# Patient Record
Sex: FEMALE | NOT HISPANIC OR LATINO | URBAN - METROPOLITAN AREA
[De-identification: names, ages, dates, MRNs, and addresses within clinical notes are randomized per-mention and may not be internally consistent; named-entity substitution may affect disease eponyms.]

---

## 2018-01-31 ENCOUNTER — OFFICE (OUTPATIENT)
Dept: URBAN - METROPOLITAN AREA CLINIC 64 | Facility: CLINIC | Age: 43
End: 2018-01-31

## 2018-01-31 VITALS
SYSTOLIC BLOOD PRESSURE: 117 MMHG | HEIGHT: 60 IN | DIASTOLIC BLOOD PRESSURE: 68 MMHG | WEIGHT: 127 LBS | HEART RATE: 64 BPM

## 2018-01-31 DIAGNOSIS — B18.2 CHRONIC VIRAL HEPATITIS C: ICD-10-CM

## 2018-01-31 DIAGNOSIS — R10.11 RIGHT UPPER QUADRANT PAIN: ICD-10-CM

## 2018-01-31 PROCEDURE — 99213 OFFICE O/P EST LOW 20 MIN: CPT | Performed by: INTERNAL MEDICINE

## 2018-12-11 ENCOUNTER — OFFICE (OUTPATIENT)
Dept: URBAN - METROPOLITAN AREA CLINIC 64 | Facility: CLINIC | Age: 43
End: 2018-12-11
Payer: COMMERCIAL

## 2018-12-11 VITALS
HEART RATE: 73 BPM | HEIGHT: 60 IN | WEIGHT: 132 LBS | SYSTOLIC BLOOD PRESSURE: 123 MMHG | DIASTOLIC BLOOD PRESSURE: 80 MMHG

## 2018-12-11 DIAGNOSIS — B18.2 CHRONIC VIRAL HEPATITIS C: ICD-10-CM

## 2018-12-11 PROCEDURE — 99213 OFFICE O/P EST LOW 20 MIN: CPT | Performed by: NURSE PRACTITIONER

## 2020-04-24 ENCOUNTER — OFFICE (OUTPATIENT)
Dept: URBAN - METROPOLITAN AREA CLINIC 64 | Facility: CLINIC | Age: 45
End: 2020-04-24

## 2020-08-07 ENCOUNTER — OFFICE (OUTPATIENT)
Dept: URBAN - METROPOLITAN AREA CLINIC 64 | Facility: CLINIC | Age: 45
End: 2020-08-07

## 2020-08-07 VITALS
DIASTOLIC BLOOD PRESSURE: 93 MMHG | HEIGHT: 60 IN | SYSTOLIC BLOOD PRESSURE: 145 MMHG | WEIGHT: 130 LBS | HEART RATE: 87 BPM

## 2020-08-07 DIAGNOSIS — B18.2 CHRONIC VIRAL HEPATITIS C: ICD-10-CM

## 2020-08-07 DIAGNOSIS — B35.1 TINEA UNGUIUM: ICD-10-CM

## 2020-08-07 PROCEDURE — 99213 OFFICE O/P EST LOW 20 MIN: CPT | Performed by: INTERNAL MEDICINE

## 2020-08-07 RX ORDER — TERBINAFINE HYDROCHLORIDE 250 MG/1
TABLET ORAL
Qty: 90 | Refills: 1 | Status: COMPLETED
Start: 2020-08-07 | End: 2022-08-12

## 2022-08-12 ENCOUNTER — OFFICE (OUTPATIENT)
Dept: URBAN - METROPOLITAN AREA CLINIC 64 | Facility: CLINIC | Age: 47
End: 2022-08-12

## 2022-08-12 VITALS
HEIGHT: 60 IN | SYSTOLIC BLOOD PRESSURE: 141 MMHG | WEIGHT: 136 LBS | DIASTOLIC BLOOD PRESSURE: 82 MMHG | HEART RATE: 68 BPM

## 2022-08-12 DIAGNOSIS — Z83.71 FAMILY HISTORY OF COLONIC POLYPS: ICD-10-CM

## 2022-08-12 DIAGNOSIS — Z80.0 FAMILY HISTORY OF MALIGNANT NEOPLASM OF DIGESTIVE ORGANS: ICD-10-CM

## 2022-08-12 PROCEDURE — 99214 OFFICE O/P EST MOD 30 MIN: CPT | Performed by: INTERNAL MEDICINE

## 2022-10-21 ENCOUNTER — ON CAMPUS - OUTPATIENT (OUTPATIENT)
Dept: URBAN - METROPOLITAN AREA HOSPITAL 2 | Facility: HOSPITAL | Age: 47
End: 2022-10-21
Payer: COMMERCIAL

## 2022-10-21 ENCOUNTER — OFFICE (OUTPATIENT)
Dept: URBAN - METROPOLITAN AREA PATHOLOGY 4 | Facility: PATHOLOGY | Age: 47
End: 2022-10-21
Payer: COMMERCIAL

## 2022-10-21 VITALS
SYSTOLIC BLOOD PRESSURE: 124 MMHG | DIASTOLIC BLOOD PRESSURE: 89 MMHG | SYSTOLIC BLOOD PRESSURE: 114 MMHG | DIASTOLIC BLOOD PRESSURE: 78 MMHG | SYSTOLIC BLOOD PRESSURE: 146 MMHG | HEART RATE: 72 BPM | DIASTOLIC BLOOD PRESSURE: 82 MMHG | HEART RATE: 74 BPM | HEART RATE: 70 BPM | DIASTOLIC BLOOD PRESSURE: 56 MMHG | WEIGHT: 138 LBS | RESPIRATION RATE: 18 BRPM | DIASTOLIC BLOOD PRESSURE: 79 MMHG | DIASTOLIC BLOOD PRESSURE: 87 MMHG | HEART RATE: 64 BPM | SYSTOLIC BLOOD PRESSURE: 112 MMHG | TEMPERATURE: 97.8 F | OXYGEN SATURATION: 98 % | DIASTOLIC BLOOD PRESSURE: 75 MMHG | SYSTOLIC BLOOD PRESSURE: 113 MMHG | HEART RATE: 67 BPM | RESPIRATION RATE: 15 BRPM | SYSTOLIC BLOOD PRESSURE: 122 MMHG | SYSTOLIC BLOOD PRESSURE: 121 MMHG | OXYGEN SATURATION: 99 % | OXYGEN SATURATION: 100 % | HEART RATE: 71 BPM | HEIGHT: 60 IN | RESPIRATION RATE: 12 BRPM | RESPIRATION RATE: 16 BRPM | HEART RATE: 65 BPM | HEART RATE: 68 BPM

## 2022-10-21 DIAGNOSIS — Z12.11 ENCOUNTER FOR SCREENING FOR MALIGNANT NEOPLASM OF COLON: ICD-10-CM

## 2022-10-21 DIAGNOSIS — K63.5 POLYP OF COLON: ICD-10-CM

## 2022-10-21 DIAGNOSIS — Z83.71 FAMILY HISTORY OF COLONIC POLYPS: ICD-10-CM

## 2022-10-21 DIAGNOSIS — Z80.0 FAMILY HISTORY OF MALIGNANT NEOPLASM OF DIGESTIVE ORGANS: ICD-10-CM

## 2022-10-21 DIAGNOSIS — D12.4 BENIGN NEOPLASM OF DESCENDING COLON: ICD-10-CM

## 2022-10-21 LAB
GI HISTOLOGY: A. UNSPECIFIED: (no result)
GI HISTOLOGY: PDF REPORT: (no result)

## 2022-10-21 PROCEDURE — 45385 COLONOSCOPY W/LESION REMOVAL: CPT | Mod: 33 | Performed by: INTERNAL MEDICINE

## 2022-10-21 PROCEDURE — 88305 TISSUE EXAM BY PATHOLOGIST: CPT | Performed by: INTERNAL MEDICINE

## 2022-12-02 ENCOUNTER — OFFICE VISIT (OUTPATIENT)
Dept: CARDIOLOGY | Facility: CLINIC | Age: 47
End: 2022-12-02

## 2022-12-02 VITALS
OXYGEN SATURATION: 100 % | SYSTOLIC BLOOD PRESSURE: 143 MMHG | WEIGHT: 141 LBS | HEART RATE: 81 BPM | BODY MASS INDEX: 27.68 KG/M2 | HEIGHT: 60 IN | DIASTOLIC BLOOD PRESSURE: 95 MMHG

## 2022-12-02 DIAGNOSIS — R07.9 CHEST PAIN WITH MODERATE RISK FOR CARDIAC ETIOLOGY: Primary | ICD-10-CM

## 2022-12-02 DIAGNOSIS — Z00.00 PREVENTATIVE HEALTH CARE: ICD-10-CM

## 2022-12-02 DIAGNOSIS — R03.0 ELEVATED BLOOD PRESSURE READING IN OFFICE WITHOUT DIAGNOSIS OF HYPERTENSION: ICD-10-CM

## 2022-12-02 PROCEDURE — 99204 OFFICE O/P NEW MOD 45 MIN: CPT | Performed by: INTERNAL MEDICINE

## 2022-12-02 RX ORDER — NITROGLYCERIN 0.4 MG/1
0.4 TABLET SUBLINGUAL
COMMUNITY
Start: 2022-11-16

## 2022-12-02 RX ORDER — GLUCOSAMINE/D3/BOSWELLIA SERRA 1500MG-400
TABLET ORAL
COMMUNITY

## 2022-12-02 NOTE — PROGRESS NOTES
Encounter Date:12/02/2022      Patient ID: Lidia Khalil is a 47 y.o. female.    Chief Complaint   Patient presents with   • Abnormal ECG          History of Present Illness  Lidia is a 47-year-old with a history of hepatitis C status posttreatment and comes in as a new patient consult for chest pain.  She said about 2 weeks ago she was sitting and having her coffee when she felt a tightness in her chest which was going straight to the back.  It occurred at rest.  It did not radiate down her arm or up her neck.  Her  also mentions that she was pale during this episode.  She also felt like she could not catch her breath.  She did not go to the hospital for this episode and she said it resolved on its own after about 10 minutes.  She then saw her PCP and EKG in the office showed normal sinus rhythm with poor R wave progression in the anterolateral leads.    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  However she does go hunting with her family.  She has noticed that whenever she has to go up a hill or exert herself she does get short of breath and fatigued easily.  She has not no further episodes of chest pain.  She said she had a lipid panel with her PCP which she reports is normal.  No evidence of diabetes on her lab work earlier this year.    EKG reviewed in clinic today showed normal sinus rhythm with poor R wave progression.    The following portions of the patient's history were reviewed and updated as appropriate: allergies, current medications, past family history, past medical history, past social history, past surgical history, and problem list.    Review of Systems   Constitutional: Negative for malaise/fatigue.   Cardiovascular: Positive for chest pain and palpitations. Negative for dyspnea on exertion and leg swelling.   Respiratory: Negative for cough and shortness of breath.    Gastrointestinal: Negative for abdominal pain, nausea and vomiting.   Neurological: Negative for  dizziness, focal weakness, headaches, light-headedness and numbness.   All other systems reviewed and are negative.        Current Outpatient Medications:   •  Biotin 88005 MCG tablet, Take  by mouth., Disp: , Rfl:   •  nitroglycerin (NITROSTAT) 0.4 MG SL tablet, Place 0.4 mg under the tongue Every 5 (Five) Minutes As Needed. do not exceed a total of 3 doses in 15 minutes, Disp: , Rfl:     No Known Allergies    Family History   Problem Relation Age of Onset   • Heart failure Mother    • Hypertension Mother    • Heart disease Mother    • Hyperlipidemia Father    • Asthma Father    • Heart disease Father    • Hypertension Father    • No Known Problems Sister    • No Known Problems Brother    • No Known Problems Maternal Aunt    • No Known Problems Maternal Uncle    • No Known Problems Paternal Aunt    • No Known Problems Paternal Uncle    • No Known Problems Maternal Grandmother    • No Known Problems Maternal Grandfather    • No Known Problems Paternal Grandmother    • No Known Problems Paternal Grandfather    • No Known Problems Other    • Anemia Neg Hx    • Arrhythmia Neg Hx    • Clotting disorder Neg Hx    • Fainting Neg Hx    • Heart attack Neg Hx    • Diabetes Neg Hx    • Arthritis Neg Hx    • Cancer Neg Hx        Past Surgical History:   Procedure Laterality Date   • DIAGNOSTIC LAPAROSCOPY  1989   • DILATATION AND CURETTAGE  2011   • TONSILLECTOMY  1990   • TUBAL ABDOMINAL LIGATION  1999       Past Medical History:   Diagnosis Date   • Abnormal ECG 11-   • Colon polyps    • Hepatitis        Social History     Socioeconomic History   • Marital status:    Tobacco Use   • Smoking status: Former     Packs/day: 1.50     Years: 18.00     Pack years: 27.00     Types: Cigarettes     Start date: 5/1/1989     Quit date: 8/13/2007     Years since quitting: 15.3     Passive exposure: Past   • Smokeless tobacco: Never   Vaping Use   • Vaping Use: Never used   Substance and Sexual Activity   • Alcohol use: Yes  "    Alcohol/week: 7.0 standard drinks     Types: 7 Shots of liquor per week   • Drug use: Not Currently     Types: Amphetamines     Comment: [id number]   • Sexual activity: Yes     Partners: Male     Birth control/protection: Surgical         Procedures      Objective:       Physical Exam    /95 (BP Location: Left arm)   Pulse 81   Ht 152.4 cm (60\")   Wt 64 kg (141 lb)   SpO2 100%   BMI 27.54 kg/m²   The patient is alert, oriented and in no distress.    Vital signs as noted above.    Head and neck revealed no carotid bruits or jugular venous distension.  No thyromegaly or lymphadenopathy is present.    Lungs clear.  No wheezing.  Breath sounds are normal bilaterally.    Heart normal first and second heart sounds.  No murmur..  No pericardial rub is present.  No gallop is present.    Abdomen soft and nontender.  No organomegaly is present.    Extremities revealed good peripheral pulses without any pedal edema.    Skin warm and dry.    Musculoskeletal system is grossly normal.    CNS grossly normal.           Diagnosis Plan   1. Chest pain with moderate risk for cardiac etiology  Stress Test With Myocardial Perfusion One Day    Adult Transthoracic Echo Complete W/ Color, Spectral and Contrast if Necessary Per Protocol      2. Elevated blood pressure reading in office without diagnosis of hypertension        3. Preventative health care        LAB RESULTS (LAST 7 DAYS)    CBC        BMP        CMP         BNP        TROPONIN        CoAg        Creatinine Clearance  CrCl cannot be calculated (No successful lab value found.).    ABG        Radiology  No radiology results for the last day         Assessment and Plan       Diagnoses and all orders for this visit:    1. Chest pain with moderate risk for cardiac etiology (Primary)  -     Stress Test With Myocardial Perfusion One Day; Future  -     Adult Transthoracic Echo Complete W/ Color, Spectral and Contrast if Necessary Per Protocol; Future    2. Elevated blood " pressure reading in office without diagnosis of hypertension    3. Preventative health care         Chest pain  Her symptoms are concerning for angina  She is also had a decrease in her activity and gets winded easily now.  I would like to proceed with a stress test  Risk factors include family history and age.  She has no history of gestational diabetes or hypertension  Go to the ER if she has any further episodes of chest pain    Preventative care  Lipid Panel and diabetes screening negative with PCP    Elevated blood pressure  Blood pressure in clinic is 139/88  At home she usually runs in the 120s over 80s  Continue to monitor with home blood pressure cuff    Eva Tolentino MD

## 2022-12-07 ENCOUNTER — HOSPITAL ENCOUNTER (OUTPATIENT)
Dept: CARDIOLOGY | Facility: HOSPITAL | Age: 47
Discharge: HOME | End: 2022-12-07

## 2022-12-07 VITALS — HEIGHT: 60 IN | BODY MASS INDEX: 27.68 KG/M2 | WEIGHT: 141 LBS

## 2022-12-07 DIAGNOSIS — R07.9 CHEST PAIN WITH MODERATE RISK FOR CARDIAC ETIOLOGY: ICD-10-CM

## 2022-12-07 PROCEDURE — 93017 CV STRESS TEST TRACING ONLY: CPT

## 2022-12-07 PROCEDURE — A9500 TC99M SESTAMIBI: HCPCS | Performed by: INTERNAL MEDICINE

## 2022-12-07 PROCEDURE — 0 TECHNETIUM SESTAMIBI: Performed by: INTERNAL MEDICINE

## 2022-12-07 PROCEDURE — 93016 CV STRESS TEST SUPVJ ONLY: CPT | Performed by: INTERNAL MEDICINE

## 2022-12-07 PROCEDURE — 93306 TTE W/DOPPLER COMPLETE: CPT

## 2022-12-07 PROCEDURE — 78452 HT MUSCLE IMAGE SPECT MULT: CPT

## 2022-12-07 PROCEDURE — 93018 CV STRESS TEST I&R ONLY: CPT | Performed by: INTERNAL MEDICINE

## 2022-12-07 PROCEDURE — 93306 TTE W/DOPPLER COMPLETE: CPT | Performed by: INTERNAL MEDICINE

## 2022-12-07 PROCEDURE — 78452 HT MUSCLE IMAGE SPECT MULT: CPT | Performed by: INTERNAL MEDICINE

## 2022-12-07 RX ADMIN — TECHNETIUM TC 99M SESTAMIBI 1 DOSE: 1 INJECTION INTRAVENOUS at 10:09

## 2022-12-07 RX ADMIN — TECHNETIUM TC 99M SESTAMIBI 1 DOSE: 1 INJECTION INTRAVENOUS at 09:07

## 2022-12-09 ENCOUNTER — TELEPHONE (OUTPATIENT)
Dept: CARDIOLOGY | Facility: CLINIC | Age: 47
End: 2022-12-09

## 2022-12-12 LAB
BH CV ECHO MEAS - ACS: 1.67 CM
BH CV ECHO MEAS - AO MAX PG: 6.1 MMHG
BH CV ECHO MEAS - AO MEAN PG: 3.6 MMHG
BH CV ECHO MEAS - AO ROOT DIAM: 2.7 CM
BH CV ECHO MEAS - AO V2 MAX: 123.1 CM/SEC
BH CV ECHO MEAS - AO V2 VTI: 29.7 CM
BH CV ECHO MEAS - AVA(I,D): 1.34 CM2
BH CV ECHO MEAS - EDV(CUBED): 73.5 ML
BH CV ECHO MEAS - EDV(MOD-SP2): 92.5 ML
BH CV ECHO MEAS - EDV(MOD-SP4): 62.1 ML
BH CV ECHO MEAS - EF(MOD-SP2): 62.5 %
BH CV ECHO MEAS - EF(MOD-SP4): 56.2 %
BH CV ECHO MEAS - ESV(CUBED): 39.1 ML
BH CV ECHO MEAS - ESV(MOD-SP2): 34.7 ML
BH CV ECHO MEAS - ESV(MOD-SP4): 27.2 ML
BH CV ECHO MEAS - FS: 19 %
BH CV ECHO MEAS - IVS/LVPW: 1.01 CM
BH CV ECHO MEAS - IVSD: 0.91 CM
BH CV ECHO MEAS - LA DIMENSION: 2.7 CM
BH CV ECHO MEAS - LV DIASTOLIC VOL/BSA (35-75): 38.6 CM2
BH CV ECHO MEAS - LV MASS(C)D: 118.8 GRAMS
BH CV ECHO MEAS - LV MAX PG: 3.6 MMHG
BH CV ECHO MEAS - LV MEAN PG: 2.07 MMHG
BH CV ECHO MEAS - LV SYSTOLIC VOL/BSA (12-30): 16.9 CM2
BH CV ECHO MEAS - LV V1 MAX: 95.3 CM/SEC
BH CV ECHO MEAS - LV V1 VTI: 19.7 CM
BH CV ECHO MEAS - LVIDD: 4.2 CM
BH CV ECHO MEAS - LVIDS: 3.4 CM
BH CV ECHO MEAS - LVOT AREA: 2.02 CM2
BH CV ECHO MEAS - LVOT DIAM: 1.6 CM
BH CV ECHO MEAS - LVPWD: 0.9 CM
BH CV ECHO MEAS - MV A MAX VEL: 86.4 CM/SEC
BH CV ECHO MEAS - MV DEC SLOPE: 464.2 CM/SEC2
BH CV ECHO MEAS - MV DEC TIME: 0.18 MSEC
BH CV ECHO MEAS - MV E MAX VEL: 83.1 CM/SEC
BH CV ECHO MEAS - MV E/A: 0.96
BH CV ECHO MEAS - MV MAX PG: 2.9 MMHG
BH CV ECHO MEAS - MV MEAN PG: 1.38 MMHG
BH CV ECHO MEAS - MV V2 VTI: 30 CM
BH CV ECHO MEAS - MVA(VTI): 1.32 CM2
BH CV ECHO MEAS - PA ACC TIME: 0.15 SEC
BH CV ECHO MEAS - PA PR(ACCEL): 13.6 MMHG
BH CV ECHO MEAS - PULM DIAS VEL: 58.8 CM/SEC
BH CV ECHO MEAS - PULM S/D: 1.34
BH CV ECHO MEAS - PULM SYS VEL: 78.9 CM/SEC
BH CV ECHO MEAS - RAP SYSTOLE: 3 MMHG
BH CV ECHO MEAS - RV MAX PG: 2.27 MMHG
BH CV ECHO MEAS - RV V1 MAX: 75.4 CM/SEC
BH CV ECHO MEAS - RV V1 VTI: 14.5 CM
BH CV ECHO MEAS - RVDD: 2.9 CM
BH CV ECHO MEAS - RVSP: 17.6 MMHG
BH CV ECHO MEAS - SI(MOD-SP2): 36 ML/M2
BH CV ECHO MEAS - SI(MOD-SP4): 21.7 ML/M2
BH CV ECHO MEAS - SV(LVOT): 39.7 ML
BH CV ECHO MEAS - SV(MOD-SP2): 57.8 ML
BH CV ECHO MEAS - SV(MOD-SP4): 34.9 ML
BH CV ECHO MEAS - TR MAX PG: 14.6 MMHG
BH CV ECHO MEAS - TR MAX VEL: 191.4 CM/SEC
MAXIMAL PREDICTED HEART RATE: 173 BPM
STRESS TARGET HR: 147 BPM

## 2022-12-13 LAB
BH CV REST NUCLEAR ISOTOPE DOSE: 9.3 MCI
BH CV STRESS BP STAGE 1: NORMAL
BH CV STRESS BP STAGE 2: NORMAL
BH CV STRESS BP STAGE 3: NORMAL
BH CV STRESS DURATION MIN STAGE 1: 3
BH CV STRESS DURATION MIN STAGE 2: 3
BH CV STRESS DURATION MIN STAGE 3: 2
BH CV STRESS DURATION SEC STAGE 1: 0
BH CV STRESS DURATION SEC STAGE 2: 0
BH CV STRESS DURATION SEC STAGE 3: 20
BH CV STRESS GRADE STAGE 1: 10
BH CV STRESS GRADE STAGE 2: 12
BH CV STRESS GRADE STAGE 3: 14
BH CV STRESS HR STAGE 1: 113
BH CV STRESS HR STAGE 2: 127
BH CV STRESS HR STAGE 3: 146
BH CV STRESS METS STAGE 1: 5
BH CV STRESS METS STAGE 2: 7.5
BH CV STRESS METS STAGE 3: 10
BH CV STRESS NUCLEAR ISOTOPE DOSE: 32.3 MCI
BH CV STRESS PROTOCOL 1: NORMAL
BH CV STRESS RECOVERY BP: NORMAL MMHG
BH CV STRESS RECOVERY HR: 103 BPM
BH CV STRESS SPEED STAGE 1: 1.7
BH CV STRESS SPEED STAGE 2: 2.5
BH CV STRESS SPEED STAGE 3: 3.4
BH CV STRESS STAGE 1: 1
BH CV STRESS STAGE 2: 2
BH CV STRESS STAGE 3: 3
LV EF NUC BP: 71 %
MAXIMAL PREDICTED HEART RATE: 173 BPM
PERCENT MAX PREDICTED HR: 84.39 %
STRESS BASELINE BP: NORMAL MMHG
STRESS BASELINE HR: 72 BPM
STRESS PERCENT HR: 99 %
STRESS POST EXERCISE DUR MIN: 8 MIN
STRESS POST EXERCISE DUR SEC: 20 SEC
STRESS POST PEAK BP: NORMAL MMHG
STRESS POST PEAK HR: 146 BPM
STRESS TARGET HR: 147 BPM

## 2022-12-13 RX ORDER — METOPROLOL SUCCINATE 25 MG/1
25 TABLET, EXTENDED RELEASE ORAL DAILY
Qty: 90 TABLET | Refills: 3 | Status: SHIPPED | OUTPATIENT
Start: 2022-12-13

## 2022-12-15 ENCOUNTER — TELEPHONE (OUTPATIENT)
Dept: CARDIOLOGY | Facility: CLINIC | Age: 47
End: 2022-12-15

## 2022-12-15 NOTE — TELEPHONE ENCOUNTER
----- Message from iLdia Khalil sent at 12/15/2022 12:57 PM EST -----  Regarding: Risk   Contact: [phone]  Hi Dr Tolentino ,   Sorry to bother you again, I just have a question . I was curious about my risk for another infarction, will taking my medication and exercise help mitigate that? It's kind of worrisome for an infarct but no blockages hard to understand what caused it .

## 2023-05-03 ENCOUNTER — TELEPHONE (OUTPATIENT)
Dept: CARDIOLOGY | Facility: CLINIC | Age: 48
End: 2023-05-03
Payer: COMMERCIAL

## 2023-05-03 NOTE — TELEPHONE ENCOUNTER
Called patient and she stated her symptoms have subsided and per patient- today's symptoms were the first since her stress test and Echo in December 2022.  Moved patient's appt with Dr. Tolentino to Friday 5/12/23 at 11:30-advised patient to call 911/go to the ER if her symptoms re-occur and do not subside prior to her appt with Dr. Tolentino on 5/12/23-patient verbalizes understanding.

## 2023-05-03 NOTE — TELEPHONE ENCOUNTER
----- Message from Lidia Khalil sent at 5/3/2023  5:43 AM EDT -----  Regarding: Chest tightness  Contact: [phone]  I plan to. All the office this morning as well. I woke up a little before 430 my chest was tight and felt the pressure in my back . It wasn't as bad as the last time and only lasted 5 or 6 minutes.  My heart rate and ekg on my "[employer] watch were okay. I hadn't slept good in a couple days before last night. Could it be just from not getting enough rest lately since I'm not sleeping good ? Should I make an appt to come in before my June 2 appt?

## 2023-05-12 ENCOUNTER — OFFICE VISIT (OUTPATIENT)
Dept: CARDIOLOGY | Facility: CLINIC | Age: 48
End: 2023-05-12
Payer: COMMERCIAL

## 2023-05-12 VITALS
DIASTOLIC BLOOD PRESSURE: 79 MMHG | WEIGHT: 134 LBS | SYSTOLIC BLOOD PRESSURE: 123 MMHG | HEIGHT: 60 IN | BODY MASS INDEX: 26.31 KG/M2 | OXYGEN SATURATION: 100 % | HEART RATE: 65 BPM

## 2023-05-12 DIAGNOSIS — I20.0 UNSTABLE ANGINA: Primary | ICD-10-CM

## 2023-05-12 DIAGNOSIS — I10 PRIMARY HYPERTENSION: ICD-10-CM

## 2023-05-12 NOTE — H&P (VIEW-ONLY)
Encounter Date:05/12/2023      Patient ID: Lidia Khalil is a 47 y.o. female.    Chief Complaint   Patient presents with   • Chest Pain          History of Present Illness  Lidia is a 47-year-old with a history of hepatitis C status posttreatment and comes in as a follow-up for chest pain.    I last saw her about 5 months ago when she had an episode of chest pain radiating to her back.  At that time she had a stress test done which showed decreased uptake at rest in the septal wall which improved with stress.  She now comes in with another episode of chest pain which occurred while she was sleeping.  It woke her up from sleep and she describes it as a squeezing sensation in the center of her chest radiating to her back and associated with nausea.  This episode lasted for about 5 minutes.  No numbness or tingling.  No jaw pain.  Some mild diaphoresis.  She also notices that she has had some restlessness and inability to sleep well for the last few weeks.  She has continued to work and tries to remain active.    Results for orders placed during the hospital encounter of 12/07/22    Adult Transthoracic Echo Complete W/ Color, Spectral and Contrast if Necessary Per Protocol    Interpretation Summary  •  Left ventricular ejection fraction appears to be 56 - 60%.  •  Left ventricular diastolic function was normal.  •  Estimated right ventricular systolic pressure from tricuspid regurgitation is normal (<35 mmHg).      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  However she does go hunting with her family.  She has noticed that whenever she has to go up a hill or exert herself she does get short of breath and fatigued easily.    The following portions of the patient's history were reviewed and updated as appropriate: allergies, current medications, past family history, past medical history, past social history, past surgical history, and problem list.    Review of Systems    Constitutional: Positive for malaise/fatigue.   Cardiovascular: Positive for leg swelling and palpitations. Negative for chest pain and dyspnea on exertion.   Respiratory: Negative for cough and shortness of breath.    Gastrointestinal: Negative for abdominal pain, nausea and vomiting.   Neurological: Negative for dizziness, focal weakness, headaches, light-headedness and numbness.   All other systems reviewed and are negative.        Current Outpatient Medications:   •  metoprolol succinate XL (TOPROL-XL) 25 MG 24 hr tablet, Take 1 tablet by mouth Daily., Disp: 90 tablet, Rfl: 3  •  Biotin 20942 MCG tablet, Take  by mouth., Disp: , Rfl:   •  nitroglycerin (NITROSTAT) 0.4 MG SL tablet, Place 1 tablet under the tongue Every 5 (Five) Minutes As Needed. do not exceed a total of 3 doses in 15 minutes, Disp: , Rfl:     No Known Allergies    Family History   Problem Relation Age of Onset   • Heart failure Mother    • Hypertension Mother    • Heart disease Mother    • Hyperlipidemia Father    • Asthma Father    • Heart disease Father    • Hypertension Father    • No Known Problems Sister    • No Known Problems Brother    • No Known Problems Maternal Aunt    • No Known Problems Maternal Uncle    • No Known Problems Paternal Aunt    • No Known Problems Paternal Uncle    • No Known Problems Maternal Grandmother    • No Known Problems Maternal Grandfather    • No Known Problems Paternal Grandmother    • No Known Problems Paternal Grandfather    • No Known Problems Other    • Anemia Neg Hx    • Arrhythmia Neg Hx    • Clotting disorder Neg Hx    • Fainting Neg Hx    • Heart attack Neg Hx    • Diabetes Neg Hx    • Arthritis Neg Hx    • Cancer Neg Hx        Past Surgical History:   Procedure Laterality Date   • DIAGNOSTIC LAPAROSCOPY  1989   • DILATATION AND CURETTAGE  2011   • TONSILLECTOMY  1990   • TUBAL ABDOMINAL LIGATION  1999       Past Medical History:   Diagnosis Date   • Abnormal ECG 11-   • Colon polyps    •  "Hepatitis        Social History     Socioeconomic History   • Marital status:    Tobacco Use   • Smoking status: Former     Packs/day: 1.50     Years: 18.00     Pack years: 27.00     Types: Cigarettes     Start date: 5/1/1989     Quit date: 8/13/2007     Years since quitting: 15.7     Passive exposure: Past   • Smokeless tobacco: Never   Vaping Use   • Vaping Use: Never used   Substance and Sexual Activity   • Alcohol use: Yes     Alcohol/week: 7.0 standard drinks     Types: 7 Shots of liquor per week   • Drug use: Not Currently     Types: Amphetamines     Comment: [id number]   • Sexual activity: Yes     Partners: Male     Birth control/protection: Surgical           ECG 12 Lead    Date/Time: 5/12/2023 12:01 PM  Performed by: Eva Tolentino MD  Authorized by: Eva Tolentino MD   Comparison: compared with previous ECG   Similar to previous ECG  Rhythm: sinus rhythm  Rate: normal  BPM: 65  Conduction: conduction normal  QRS axis: normal    Clinical impression: normal ECG              Objective:       Physical Exam    /79 (BP Location: Right arm, Patient Position: Sitting)   Pulse 65   Ht 152.4 cm (60\")   Wt 60.8 kg (134 lb)   SpO2 100%   BMI 26.17 kg/m²   The patient is alert, oriented and in no distress.    Vital signs as noted above.    Head and neck revealed no carotid bruits or jugular venous distension.  No thyromegaly or lymphadenopathy is present.    Lungs clear.  No wheezing.  Breath sounds are normal bilaterally.    Heart normal first and second heart sounds.  No murmur..  No pericardial rub is present.  No gallop is present.    Abdomen soft and nontender.  No organomegaly is present.    Extremities revealed good peripheral pulses without any pedal edema.    Skin warm and dry.    Musculoskeletal system is grossly normal.    CNS grossly normal.           Diagnosis Plan   1. Unstable angina  Case Request Cath Lab: Left Heart Cath with Coronary Angiography    CBC (No Diff)    Basic Metabolic " Panel      2. Primary hypertension        LAB RESULTS (LAST 7 DAYS)    CBC        BMP        CMP         BNP        TROPONIN        CoAg        Creatinine Clearance  CrCl cannot be calculated (No successful lab value found.).    ABG        Radiology  No radiology results for the last day         Assessment and Plan       Diagnoses and all orders for this visit:    1. Unstable angina (Primary)  Overview:  Added automatically from request for surgery [id number]    Orders:  -     Case Request Cath Lab: Left Heart Cath with Coronary Angiography  -     CBC (No Diff); Future  -     Basic Metabolic Panel; Future    2. Primary hypertension    Other orders  -     ECG 12 Lead     Chest pain  I am concerned about unstable angina given her presenting symptoms and recurrent chest pain despite being on metoprolol.  I would like to proceed with a cardiac catheterization for definitive rule out of coronary artery disease  Risks and benefits of procedure discussed with patient today and she is agreeable to proceed  We will plan for cardiac cath via right radial approach  Continue with metoprolol  Has nitroglycerin as needed  If she has another episode instructed to go to the ER    Hypertension  Well-controlled  Continue with metoprolol          Eva Tolentino MD

## 2023-05-15 ENCOUNTER — TELEPHONE (OUTPATIENT)
Dept: CARDIOLOGY | Facility: CLINIC | Age: 48
End: 2023-05-15
Payer: COMMERCIAL

## 2023-05-15 NOTE — TELEPHONE ENCOUNTER
----- Message from Lidia Khalil sent at 5/12/2023  6:18 PM EDT -----  Regarding: Laser hair removal   Contact: [phone]  I have a laser hair removal appointment next Saturday,  the day after my heart cath. Should I reschedule it or is it fine to go the day  after my cath?

## 2023-05-17 ENCOUNTER — LAB (OUTPATIENT)
Dept: LAB | Facility: HOSPITAL | Age: 48
End: 2023-05-17
Payer: COMMERCIAL

## 2023-05-17 DIAGNOSIS — I20.0 UNSTABLE ANGINA: ICD-10-CM

## 2023-05-17 LAB
ANION GAP SERPL CALCULATED.3IONS-SCNC: 7 MMOL/L (ref 5–15)
BUN SERPL-MCNC: 18 MG/DL (ref 6–20)
BUN/CREAT SERPL: 26.5 (ref 7–25)
CALCIUM SPEC-SCNC: 8.9 MG/DL (ref 8.6–10.5)
CHLORIDE SERPL-SCNC: 102 MMOL/L (ref 98–107)
CO2 SERPL-SCNC: 28 MMOL/L (ref 22–29)
CREAT SERPL-MCNC: 0.68 MG/DL (ref 0.57–1)
DEPRECATED RDW RBC AUTO: 40.1 FL (ref 37–54)
EGFRCR SERPLBLD CKD-EPI 2021: 107.6 ML/MIN/1.73
ERYTHROCYTE [DISTWIDTH] IN BLOOD BY AUTOMATED COUNT: 11.8 % (ref 12.3–15.4)
GLUCOSE SERPL-MCNC: 101 MG/DL (ref 65–99)
HCT VFR BLD AUTO: 42.5 % (ref 34–46.6)
HGB BLD-MCNC: 14.4 G/DL (ref 12–15.9)
MCH RBC QN AUTO: 31.4 PG (ref 26.6–33)
MCHC RBC AUTO-ENTMCNC: 33.9 G/DL (ref 31.5–35.7)
MCV RBC AUTO: 92.6 FL (ref 79–97)
PLATELET # BLD AUTO: 209 10*3/MM3 (ref 140–450)
PMV BLD AUTO: 10.2 FL (ref 6–12)
POTASSIUM SERPL-SCNC: 4 MMOL/L (ref 3.5–5.2)
RBC # BLD AUTO: 4.59 10*6/MM3 (ref 3.77–5.28)
SODIUM SERPL-SCNC: 137 MMOL/L (ref 136–145)
WBC NRBC COR # BLD: 4.79 10*3/MM3 (ref 3.4–10.8)

## 2023-05-17 PROCEDURE — 80061 LIPID PANEL: CPT | Performed by: NURSE PRACTITIONER

## 2023-05-17 PROCEDURE — 80048 BASIC METABOLIC PNL TOTAL CA: CPT

## 2023-05-17 PROCEDURE — 36415 COLL VENOUS BLD VENIPUNCTURE: CPT

## 2023-05-17 PROCEDURE — 85027 COMPLETE CBC AUTOMATED: CPT

## 2023-05-19 ENCOUNTER — HOSPITAL ENCOUNTER (OUTPATIENT)
Facility: HOSPITAL | Age: 48
Setting detail: HOSPITAL OUTPATIENT SURGERY
Discharge: HOME | End: 2023-05-19
Attending: INTERNAL MEDICINE | Admitting: INTERNAL MEDICINE
Payer: COMMERCIAL

## 2023-05-19 VITALS
HEART RATE: 71 BPM | TEMPERATURE: 98.1 F | OXYGEN SATURATION: 96 % | DIASTOLIC BLOOD PRESSURE: 61 MMHG | SYSTOLIC BLOOD PRESSURE: 95 MMHG | BODY MASS INDEX: 24.95 KG/M2 | HEIGHT: 62 IN | RESPIRATION RATE: 16 BRPM | WEIGHT: 135.58 LBS

## 2023-05-19 DIAGNOSIS — I20.0 UNSTABLE ANGINA: ICD-10-CM

## 2023-05-19 PROBLEM — B19.20 HEPATITIS C INFECTION: Status: ACTIVE | Noted: 2023-05-19

## 2023-05-19 LAB
CHOLEST SERPL-MCNC: 195 MG/DL (ref 0–200)
HDLC SERPL-MCNC: 57 MG/DL (ref 40–60)
LDLC SERPL CALC-MCNC: 127 MG/DL (ref 0–100)
LDLC/HDLC SERPL: 2.22 {RATIO}
TRIGL SERPL-MCNC: 58 MG/DL (ref 0–150)
VLDLC SERPL-MCNC: 11 MG/DL (ref 5–40)

## 2023-05-19 PROCEDURE — 25010000002 FENTANYL CITRATE (PF) 100 MCG/2ML SOLUTION: Performed by: INTERNAL MEDICINE

## 2023-05-19 PROCEDURE — 93458 L HRT ARTERY/VENTRICLE ANGIO: CPT | Performed by: INTERNAL MEDICINE

## 2023-05-19 PROCEDURE — 25010000002 HEPARIN (PORCINE) PER 1000 UNITS: Performed by: INTERNAL MEDICINE

## 2023-05-19 PROCEDURE — C1769 GUIDE WIRE: HCPCS | Performed by: INTERNAL MEDICINE

## 2023-05-19 PROCEDURE — 25510000001 IOPAMIDOL PER 1 ML: Performed by: INTERNAL MEDICINE

## 2023-05-19 PROCEDURE — 25010000002 MIDAZOLAM PER 1 MG: Performed by: INTERNAL MEDICINE

## 2023-05-19 PROCEDURE — C1894 INTRO/SHEATH, NON-LASER: HCPCS | Performed by: INTERNAL MEDICINE

## 2023-05-19 PROCEDURE — 99152 MOD SED SAME PHYS/QHP 5/>YRS: CPT | Performed by: INTERNAL MEDICINE

## 2023-05-19 RX ORDER — MIDAZOLAM HYDROCHLORIDE 1 MG/ML
INJECTION INTRAMUSCULAR; INTRAVENOUS
Status: DISCONTINUED | OUTPATIENT
Start: 2023-05-19 | End: 2023-05-19 | Stop reason: HOSPADM

## 2023-05-19 RX ORDER — NICARDIPINE HYDROCHLORIDE 2.5 MG/ML
INJECTION INTRAVENOUS
Status: DISCONTINUED | OUTPATIENT
Start: 2023-05-19 | End: 2023-05-19 | Stop reason: HOSPADM

## 2023-05-19 RX ORDER — FENTANYL CITRATE 50 UG/ML
INJECTION, SOLUTION INTRAMUSCULAR; INTRAVENOUS
Status: DISCONTINUED | OUTPATIENT
Start: 2023-05-19 | End: 2023-05-19 | Stop reason: HOSPADM

## 2023-05-19 RX ORDER — ONDANSETRON 2 MG/ML
4 INJECTION INTRAMUSCULAR; INTRAVENOUS EVERY 6 HOURS PRN
Status: DISCONTINUED | OUTPATIENT
Start: 2023-05-19 | End: 2023-05-19 | Stop reason: HOSPADM

## 2023-05-19 RX ORDER — NITROGLYCERIN 5 MG/ML
INJECTION, SOLUTION INTRAVENOUS
Status: DISCONTINUED | OUTPATIENT
Start: 2023-05-19 | End: 2023-05-19 | Stop reason: HOSPADM

## 2023-05-19 RX ORDER — HEPARIN SODIUM 1000 [USP'U]/ML
INJECTION, SOLUTION INTRAVENOUS; SUBCUTANEOUS
Status: DISCONTINUED | OUTPATIENT
Start: 2023-05-19 | End: 2023-05-19 | Stop reason: HOSPADM

## 2023-05-19 RX ORDER — NITROGLYCERIN 0.4 MG/1
0.4 TABLET SUBLINGUAL
Status: DISCONTINUED | OUTPATIENT
Start: 2023-05-19 | End: 2023-05-19 | Stop reason: HOSPADM

## 2023-05-19 RX ORDER — SODIUM CHLORIDE 9 MG/ML
INJECTION, SOLUTION INTRAVENOUS
Status: COMPLETED | OUTPATIENT
Start: 2023-05-19 | End: 2023-05-19

## 2023-05-19 RX ORDER — ONDANSETRON 4 MG/1
4 TABLET, FILM COATED ORAL EVERY 6 HOURS PRN
Status: DISCONTINUED | OUTPATIENT
Start: 2023-05-19 | End: 2023-05-19 | Stop reason: HOSPADM

## 2023-05-19 RX ORDER — ACETAMINOPHEN 325 MG/1
650 TABLET ORAL EVERY 4 HOURS PRN
Status: DISCONTINUED | OUTPATIENT
Start: 2023-05-19 | End: 2023-05-19 | Stop reason: HOSPADM

## 2023-05-19 RX ORDER — DIPHENHYDRAMINE HCL 25 MG
25 CAPSULE ORAL EVERY 6 HOURS PRN
Status: DISCONTINUED | OUTPATIENT
Start: 2023-05-19 | End: 2023-05-19 | Stop reason: HOSPADM

## 2023-05-19 RX ORDER — LIDOCAINE HYDROCHLORIDE 20 MG/ML
INJECTION, SOLUTION INFILTRATION; PERINEURAL
Status: DISCONTINUED | OUTPATIENT
Start: 2023-05-19 | End: 2023-05-19 | Stop reason: HOSPADM

## 2023-05-19 NOTE — DISCHARGE INSTRUCTIONS
Post Cath Instructions    Call Dr. Schneider’s office to schedule a follow up appointment in 2 weeks at [phone].    Drink plenty of fluids for the next 24 hours.  This helps to eliminate the dye used in your procedure through urination.  You may resume a normal diet; however, try to avoid foods that would cause gas or constipation.    Sedative medication given to you during your catheterization may decrease your judgement and reaction time for up to 24-48 hours.  Therefore:  DO NOT drive or operate hazardous machinery (48 hours)  DO NOT consume alcoholic beverages  DO NOT make any important/legal decisions  Have someone stay with you for at least 24 hours    To allow proper healing and prevent bleeding, the following activities are to be strictly avoided for the next 24-48 hours:  Excessive bending at wound site  Straining (anything that would tense up muscles around the affected puncture site)  Lifting objects greater than 10 pounds, pushing, or pulling for 5 days  For Arm Cases:  No flexing at the puncture site, such as hammering, golfing, bowling, or swinging any objects  No sexual activity for 2-23 weeks or until you can climb a flight of stairs without becoming short of breath.       Keep the puncture site clean and dry.  You may remove the dressing tomorrow and replace it with a band-aid for at least one additional day.  Gently clean the site with mild soap and water.  No scrubbing/rubbing and lightly pat the area dry.  Showers are acceptable; however, avoid submerging in water (tub baths, hot tubs, swimming pools, dishwater, etc…) for at least one week.  The site should be completely healed before resuming these activities to reduce the risk of infection.  Check the site often.  Watch for signs and symptoms of infection and notify your physician if any of the following occur:  Bleeding or an increase in swelling at the puncture site  Fever  Increased soreness around puncture site  Foul odor or significant  drainage from the puncture site  Swelling, redness, or warmth at the puncture site    **A bruise or small “pea sized” lump under the skin at the puncture site is not unusual.  This should disappear within 3-4 weeks.**  CONTACT YOUR PHYSICIAN OR CALL 911 IF YOU EXPERIENCE ANY OF THE FOLLOWING:  Increased angina (chest pain) or frequent sensations of pressure, burning, pain, or other discomfort in the chest, arm, jaws, or stomach  Lightheadedness, dizziness, faint feeling, sweating, or difficulty breathing  Odd sensation changes like numbness, tingling, coldness, or pain in the arm or leg in which the catheter was inserted  Limb in which the catheter was inserted becomes pale/bluish in color    IMPORTANT:  Although this occurs very rarely, if you should develop bright red or excessive bleeding, feel a “pop” inside at the insertion site, or notice a sudden increase in swelling larger than a walnut, you should call 911.  Hold continuous firm pressure to the access site until emergency personnel arrive.  It is best if someone else can do this for you.

## 2023-05-19 NOTE — Clinical Note
Hemostasis started on the right radial artery. Radial compression device applied to vessel. Hemostasis achieved successfully. Closure device additional comment: TR band with 15 cc air

## 2023-05-22 ENCOUNTER — TELEPHONE (OUTPATIENT)
Dept: CARDIOLOGY | Facility: CLINIC | Age: 48
End: 2023-05-22
Payer: COMMERCIAL

## 2023-05-22 NOTE — TELEPHONE ENCOUNTER
CALLED PT TO SCHEDULE APPT; NO ANSWER; LMOM ADVISING THAT WE COULD DO FRI 6/2 @ 2:30 OR MON 6/5 @ 2:00

## 2023-06-02 ENCOUNTER — OFFICE VISIT (OUTPATIENT)
Dept: CARDIOLOGY | Facility: CLINIC | Age: 48
End: 2023-06-02

## 2023-06-02 VITALS
HEART RATE: 85 BPM | HEIGHT: 62 IN | SYSTOLIC BLOOD PRESSURE: 112 MMHG | BODY MASS INDEX: 25.03 KG/M2 | DIASTOLIC BLOOD PRESSURE: 67 MMHG | WEIGHT: 136 LBS

## 2023-06-02 DIAGNOSIS — R07.2 PRECORDIAL PAIN: ICD-10-CM

## 2023-06-02 DIAGNOSIS — I10 PRIMARY HYPERTENSION: Primary | Chronic | ICD-10-CM

## 2023-06-02 DIAGNOSIS — R00.2 PALPITATIONS: ICD-10-CM

## 2023-06-02 DIAGNOSIS — Z00.00 PREVENTATIVE HEALTH CARE: ICD-10-CM

## 2023-06-02 NOTE — PROGRESS NOTES
Encounter Date:05/12/2023      Patient ID: Lidia Khalil is a 48 y.o. female.    Chief Complaint   Patient presents with   • Follow-up     CATH f/u 5/19/23          History of Present Illness  Lidia is a 47-year-old with a history of hepatitis C status posttreatment and comes in as a follow-up for chest pain after she had a recent cardiac catheterization.  Last time I saw her she was continuing to have chest pain which is concerning for unstable angina.  For this reason we proceeded with a cardiac catheterization which is detailed below.  She was found to have no obstructive coronary artery disease and we reviewed her films in clinic today.  She may have a component of microvascular dysfunction and her mother also has this.  She has been doing well since I last saw her.  No more episodes of chest pain.  Occasionally gets palpitations which she feels more so at nighttime when she is resting.  They do not last very long.  She continues to remain active and is getting out more now that the weather is nice.  Goes kayaking and hunting and hiking.    I reviewed her this recent lipid panel which showed that her cholesterol level was normal but her LDL was elevated at 127.  This gives her an ASCVD risk of 1%.    Results for orders placed during the hospital encounter of 12/07/22    Adult Transthoracic Echo Complete W/ Color, Spectral and Contrast if Necessary Per Protocol    Interpretation Summary  •  Left ventricular ejection fraction appears to be 56 - 60%.  •  Left ventricular diastolic function was normal.  •  Estimated right ventricular systolic pressure from tricuspid regurgitation is normal (<35 mmHg).      Cardiac catheterization  Coronary Angiogram.     1. Left main. Left main is a very short but large-caliber vessel which gives rise to the Left Anterior Descending and the Left circumflex.  Left main is angiographically free from any significant disease     2. Left Anterior Descending Artery. LAD is a large  vessel which gives rise to several septal perforators and several diagonal branches. It is angiographically free from any significant disease     3. Left Circumflex. The LCx is a medium caliber which gives rise to marginals.  Left circumflex artery is angiographically free from any significant disease     4. Right Coronary Artery. The RCA is a dominant vessel which gives rise to several small caliber branches including PDA and PLV.  Right coronary artery is angiographically free from any significant disease.     IMPRESSIONS.  1. Non-obstructive CAD  2.  Normal LVEDP     RECOMMENDATIONS.  1. Continue aggressive risk factor modification for primary prevention.  2. Exercise and lifestyle modifications recommended.        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  However she does go hunting with her family.  She has noticed that whenever she has to go up a hill or exert herself she does get short of breath and fatigued easily.    The following portions of the patient's history were reviewed and updated as appropriate: allergies, current medications, past family history, past medical history, past social history, past surgical history, and problem list.    Review of Systems   Constitutional: Negative for malaise/fatigue.   Cardiovascular: Positive for palpitations. Negative for chest pain, dyspnea on exertion and leg swelling.   Respiratory: Negative for cough and shortness of breath.    Gastrointestinal: Negative for abdominal pain, nausea and vomiting.   Neurological: Negative for dizziness, focal weakness, headaches, light-headedness and numbness.   All other systems reviewed and are negative.        Current Outpatient Medications:   •  metoprolol succinate XL (TOPROL-XL) 25 MG 24 hr tablet, Take 1 tablet by mouth Daily., Disp: 90 tablet, Rfl: 3  •  nitroglycerin (NITROSTAT) 0.4 MG SL tablet, Place 1 tablet under the tongue Every 5 (Five) Minutes As Needed. do not exceed a total  of 3 doses in 15 minutes, Disp: , Rfl:     No Known Allergies    Family History   Problem Relation Age of Onset   • Heart failure Mother    • Hypertension Mother    • Heart disease Mother         Small vessel and congestive heart failure   • Hyperlipidemia Father    • Asthma Father    • Heart disease Father    • Hypertension Father    • No Known Problems Sister    • No Known Problems Brother    • No Known Problems Maternal Aunt    • No Known Problems Maternal Uncle    • No Known Problems Paternal Aunt    • No Known Problems Paternal Uncle    • No Known Problems Maternal Grandmother    • No Known Problems Maternal Grandfather    • No Known Problems Paternal Grandmother    • No Known Problems Paternal Grandfather    • No Known Problems Other    • Anemia Neg Hx    • Arrhythmia Neg Hx    • Clotting disorder Neg Hx    • Fainting Neg Hx    • Heart attack Neg Hx    • Diabetes Neg Hx    • Arthritis Neg Hx    • Cancer Neg Hx        Past Surgical History:   Procedure Laterality Date   • CARDIAC CATHETERIZATION N/A 05/19/2023    Procedure: Left Heart Cath with Coronary Angiography;  Surgeon: Jaciel Schneider MD;  Location: [facility] CATH INVASIVE LOCATION;  Service: Cardiovascular;  Laterality: N/A;   • CARDIAC CATHETERIZATION  5-   • DIAGNOSTIC LAPAROSCOPY  1989   • DILATATION AND CURETTAGE  2011   • TONSILLECTOMY  1990   • TUBAL ABDOMINAL LIGATION  1999       Past Medical History:   Diagnosis Date   • Abnormal ECG 11/17/2022   • Colon polyps    • Hepatitis    • Hepatitis C infection    • Myocardial infarction     Nuclear ekg here   • Palpitations    • Primary hypertension        Social History     Socioeconomic History   • Marital status:    Tobacco Use   • Smoking status: Former     Packs/day: 1.50     Years: 18.00     Pack years: 27.00     Types: Cigarettes     Start date: 5/1/1989     Quit date: 8/13/2007     Years since quitting: 15.8     Passive exposure: Past   • Smokeless tobacco: Never   Vaping Use   •  "Vaping Use: Never used   Substance and Sexual Activity   • Alcohol use: Yes     Alcohol/week: 5.0 standard drinks     Types: 5 Glasses of wine per week   • Drug use: Not Currently     Types: Amphetamines     Comment: [id number]   • Sexual activity: Yes     Partners: Male     Birth control/protection: Surgical         Procedures      Objective:       Physical Exam    /67 (BP Location: Left arm, Patient Position: Sitting)   Pulse 85   Ht 156.2 cm (61.5\")   Wt 61.7 kg (136 lb)   BMI 25.28 kg/m²   The patient is alert, oriented and in no distress.    Vital signs as noted above.    Head and neck revealed no carotid bruits or jugular venous distension.  No thyromegaly or lymphadenopathy is present.    Lungs clear.  No wheezing.  Breath sounds are normal bilaterally.    Heart normal first and second heart sounds.  No murmur..  No pericardial rub is present.  No gallop is present.    Abdomen soft and nontender.  No organomegaly is present.    Extremities revealed good peripheral pulses without any pedal edema.    Skin warm and dry.    Musculoskeletal system is grossly normal.    CNS grossly normal.           Diagnosis Plan   1. Primary hypertension        2. Palpitations        3. Precordial pain        4. Preventative health care        LAB RESULTS (LAST 7 DAYS)    CBC        BMP        CMP         BNP        TROPONIN        CoAg        Creatinine Clearance  Estimated Creatinine Clearance: 86.4 mL/min (by C-G formula based on SCr of 0.68 mg/dL).    ABG        Radiology  No radiology results for the last day         Assessment and Plan       Diagnoses and all orders for this visit:    1. Primary hypertension (Primary)    2. Palpitations    3. Precordial pain    4. Preventative health care       Chest pain  May have a component of microvascular dysfunction  Continue with metoprolol  Has nitroglycerin as needed  Cardiac catheterization did not show any obstructive CAD    Hypertension  Well-controlled  Continue with " metoprolol    Palpitations  Continue with metoprolol    Preventative care  Lipid panel reviewed  ASCVD risk of 1%  Continue lifestyle and risk factor modification for primary prevention      Eva Tolentino MD

## 2023-12-04 NOTE — PROGRESS NOTES
Encounter Date:05/12/2023      Patient ID: Lidia Khalil is a 48 y.o. female.    Chief Complaint   Patient presents with    Follow-up     6 month F/U          History of Present Illness  Lidia is a 47-year-old with a history of hepatitis C status posttreatment and comes in as a follow-up for palpitations.  She has not had any further episodes of chest pain but still gets these episodes of palpitations where her heart feels like it is racing.  She did get a watch and has gotten a couple of the EKG tracings of this on her watch which she brought to clinic today.  Most these episodes showed normal sinus rhythm in the 80s to 90s.  One of them did alert for A-fib but when I reviewed it there are P waves present.  Continues to remain active and is hunting.  Plan to join the [facility] during the winter.    Previous note:  Last time I saw her she was continuing to have chest pain which is concerning for unstable angina.  For this reason we proceeded with a cardiac catheterization which is detailed below.  She was found to have no obstructive coronary artery disease and we reviewed her films in clinic today.  She may have a component of microvascular dysfunction and her mother also has this.  She has been doing well since I last saw her.  No more episodes of chest pain.  Occasionally gets palpitations which she feels more so at nighttime when she is resting.  They do not last very long.  She continues to remain active and is getting out more now that the weather is nice.  Goes kayaking and hunting and hiking.    I reviewed her this recent lipid panel which showed that her cholesterol level was normal but her LDL was elevated at 127.  This gives her an ASCVD risk of 1%.    Results for orders placed during the hospital encounter of 12/07/22    Adult Transthoracic Echo Complete W/ Color, Spectral and Contrast if Necessary Per Protocol    Interpretation Summary    Left ventricular ejection fraction appears to be 56 - 60%.    Left  ventricular diastolic function was normal.    Estimated right ventricular systolic pressure from tricuspid regurgitation is normal (<35 mmHg).      Cardiac catheterization  Coronary Angiogram.     1. Left main. Left main is a very short but large-caliber vessel which gives rise to the Left Anterior Descending and the Left circumflex.  Left main is angiographically free from any significant disease     2. Left Anterior Descending Artery. LAD is a large vessel which gives rise to several septal perforators and several diagonal branches. It is angiographically free from any significant disease     3. Left Circumflex. The LCx is a medium caliber which gives rise to marginals.  Left circumflex artery is angiographically free from any significant disease     4. Right Coronary Artery. The RCA is a dominant vessel which gives rise to several small caliber branches including PDA and PLV.  Right coronary artery is angiographically free from any significant disease.     IMPRESSIONS.  1. Non-obstructive CAD  2.  Normal LVEDP     RECOMMENDATIONS.  1. Continue aggressive risk factor modification for primary prevention.  2. Exercise and lifestyle modifications recommended.       The following portions of the patient's history were reviewed and updated as appropriate: allergies, current medications, past family history, past medical history, past social history, past surgical history, and problem list.    Review of Systems   Constitutional: Negative for malaise/fatigue.   Cardiovascular:  Positive for leg swelling and palpitations. Negative for chest pain and dyspnea on exertion.   Respiratory:  Negative for cough and shortness of breath.    Gastrointestinal:  Negative for abdominal pain, nausea and vomiting.   Neurological:  Positive for dizziness and light-headedness. Negative for focal weakness, headaches and numbness.   All other systems reviewed and are negative.        Current Outpatient Medications:     albuterol sulfate HFA  108 (90 Base) MCG/ACT inhaler, Inhale 2 puffs 4 (Four) Times a Day., Disp: , Rfl:     metoprolol succinate XL (TOPROL-XL) 25 MG 24 hr tablet, Take 1 tablet by mouth Daily., Disp: 90 tablet, Rfl: 3    nitroglycerin (NITROSTAT) 0.4 MG SL tablet, Place 1 tablet under the tongue Every 5 (Five) Minutes As Needed. do not exceed a total of 3 doses in 15 minutes, Disp: , Rfl:     No Known Allergies    Family History   Problem Relation Age of Onset    Heart failure Mother     Hypertension Mother     Heart disease Mother         Small vessel and congestive heart failure    Hyperlipidemia Father     Asthma Father     Heart disease Father     Hypertension Father     No Known Problems Sister     No Known Problems Brother     No Known Problems Maternal Aunt     No Known Problems Maternal Uncle     No Known Problems Paternal Aunt     No Known Problems Paternal Uncle     No Known Problems Maternal Grandmother     No Known Problems Maternal Grandfather     No Known Problems Paternal Grandmother     No Known Problems Paternal Grandfather     No Known Problems Other     Anemia Neg Hx     Arrhythmia Neg Hx     Clotting disorder Neg Hx     Fainting Neg Hx     Heart attack Neg Hx     Diabetes Neg Hx     Arthritis Neg Hx     Cancer Neg Hx        Past Surgical History:   Procedure Laterality Date    CARDIAC CATHETERIZATION N/A 05/19/2023    Procedure: Left Heart Cath with Coronary Angiography;  Surgeon: Jaciel Schneider MD;  Location: [facility] CATH INVASIVE LOCATION;  Service: Cardiovascular;  Laterality: N/A;    CARDIAC CATHETERIZATION  5-    DIAGNOSTIC LAPAROSCOPY  1989    DILATATION AND CURETTAGE  2011    TONSILLECTOMY  1990    TUBAL ABDOMINAL LIGATION  1999       Past Medical History:   Diagnosis Date    Abnormal ECG 11/17/2022    Colon polyps     Hepatitis     Hepatitis C infection     Myocardial infarction     Nuclear ekg here    Palpitations     Primary hypertension        Social History     Socioeconomic History    Marital  "status:    Tobacco Use    Smoking status: Former     Packs/day: 1.50     Years: 18.00     Additional pack years: 0.00     Total pack years: 27.00     Types: Cigarettes     Start date: 1989     Quit date: 2007     Years since quittin.3     Passive exposure: Past    Smokeless tobacco: Never   Vaping Use    Vaping Use: Never used   Substance and Sexual Activity    Alcohol use: Yes     Alcohol/week: 5.0 standard drinks of alcohol     Types: 5 Glasses of wine per week    Drug use: Not Currently     Types: Amphetamines     Comment: [id number]    Sexual activity: Yes     Partners: Male     Birth control/protection: Surgical         Procedures      Objective:       Physical Exam    /76 (BP Location: Left arm, Patient Position: Sitting)   Pulse 82   Ht 153.7 cm (60.5\")   Wt 66.7 kg (147 lb)   SpO2 98%   BMI 28.24 kg/m²   The patient is alert, oriented and in no distress.    Vital signs as noted above.    Head and neck revealed no carotid bruits or jugular venous distension.  No thyromegaly or lymphadenopathy is present.    Lungs clear.  No wheezing.  Breath sounds are normal bilaterally.    Heart normal first and second heart sounds.  No murmur..  No pericardial rub is present.  No gallop is present.    Abdomen soft and nontender.  No organomegaly is present.    Extremities revealed good peripheral pulses without any pedal edema.    Skin warm and dry.    Musculoskeletal system is grossly normal.    CNS grossly normal.           Diagnosis Plan   1. Palpitations  Holter Monitor - 72 Hour Up To 15 Days      2. Primary hypertension        3. Precordial pain        4. Preventative health care        5. Elevated blood pressure reading in office without diagnosis of hypertension          LAB RESULTS (LAST 7 DAYS)    CBC        BMP        CMP         BNP        TROPONIN        CoAg        Creatinine Clearance  CrCl cannot be calculated (Patient's most recent lab result is older than the maximum 30 days " allowed.).    ABG        Radiology  No radiology results for the last day         Assessment and Plan       Diagnoses and all orders for this visit:    1. Palpitations (Primary)  -     Holter Monitor - 72 Hour Up To 15 Days; Future    2. Primary hypertension    3. Precordial pain    4. Preventative health care    5. Elevated blood pressure reading in office without diagnosis of hypertension         Chest pain  No further episodes  May have a component of microvascular dysfunction  Continue with metoprolol  Has nitroglycerin as needed  Cardiac catheterization did not show any obstructive CAD    Hypertension  Well-controlled  Continue with metoprolol    Palpitations  Continue with metoprolol  15-day Holter    Preventative care  Lipid panel reviewed  ASCVD risk of 1%  Continue lifestyle and risk factor modification for primary prevention      Eva Tolentino MD

## 2023-12-08 ENCOUNTER — OFFICE VISIT (OUTPATIENT)
Dept: CARDIOLOGY | Facility: CLINIC | Age: 48
End: 2023-12-08
Payer: COMMERCIAL

## 2023-12-08 VITALS
DIASTOLIC BLOOD PRESSURE: 76 MMHG | BODY MASS INDEX: 27.75 KG/M2 | SYSTOLIC BLOOD PRESSURE: 131 MMHG | WEIGHT: 147 LBS | HEART RATE: 82 BPM | OXYGEN SATURATION: 98 % | HEIGHT: 61 IN

## 2023-12-08 DIAGNOSIS — R07.2 PRECORDIAL PAIN: ICD-10-CM

## 2023-12-08 DIAGNOSIS — Z00.00 PREVENTATIVE HEALTH CARE: ICD-10-CM

## 2023-12-08 DIAGNOSIS — R00.2 PALPITATIONS: Primary | ICD-10-CM

## 2023-12-08 DIAGNOSIS — R03.0 ELEVATED BLOOD PRESSURE READING IN OFFICE WITHOUT DIAGNOSIS OF HYPERTENSION: ICD-10-CM

## 2023-12-08 DIAGNOSIS — I10 PRIMARY HYPERTENSION: ICD-10-CM

## 2023-12-08 RX ORDER — ALBUTEROL SULFATE 90 UG/1
2 AEROSOL, METERED RESPIRATORY (INHALATION)
COMMUNITY
Start: 2023-10-10

## 2023-12-12 RX ORDER — METOPROLOL SUCCINATE 25 MG/1
25 TABLET, EXTENDED RELEASE ORAL DAILY
Qty: 90 TABLET | Refills: 3 | Status: SHIPPED | OUTPATIENT
Start: 2023-12-12

## 2023-12-12 NOTE — TELEPHONE ENCOUNTER
Rx Refill Note  Requested Prescriptions     Pending Prescriptions Disp Refills    metoprolol succinate XL (TOPROL-XL) 25 MG 24 hr tablet [Pharmacy Med Name: Metoprolol Succinate ER 25 MG Oral Tablet Extended Release 24 Hour] 90 tablet 3     Sig: Take 1 tablet by mouth once daily      Last office visit with prescribing clinician: 12/8/2023   Last telemedicine visit with prescribing clinician: Visit date not found   Next office visit with prescribing clinician: 6/14/2024                             Cecy Hopson MA  12/12/23, 08:06 EST

## 2024-06-14 ENCOUNTER — OFFICE VISIT (OUTPATIENT)
Dept: CARDIOLOGY | Facility: CLINIC | Age: 49
End: 2024-06-14
Payer: COMMERCIAL

## 2024-06-14 VITALS
WEIGHT: 142 LBS | SYSTOLIC BLOOD PRESSURE: 127 MMHG | HEIGHT: 61 IN | OXYGEN SATURATION: 96 % | HEART RATE: 70 BPM | DIASTOLIC BLOOD PRESSURE: 80 MMHG | BODY MASS INDEX: 26.81 KG/M2

## 2024-06-14 DIAGNOSIS — R00.2 PALPITATIONS: ICD-10-CM

## 2024-06-14 DIAGNOSIS — Z00.00 PREVENTATIVE HEALTH CARE: ICD-10-CM

## 2024-06-14 DIAGNOSIS — I10 PRIMARY HYPERTENSION: ICD-10-CM

## 2024-06-14 DIAGNOSIS — R07.2 PRECORDIAL PAIN: Primary | ICD-10-CM

## 2024-06-14 NOTE — PROGRESS NOTES
Encounter Date:05/12/2023      Patient ID: Lidia Khalil is a 49 y.o. female.    Chief Complaint   Patient presents with    Hypertension     6 month follow up          History of Present Illness  Lidia is a 47-year-old with a history of hepatitis C status posttreatment and comes in as a follow-up for palpitations.  She has done really well since I last saw her.  No further episodes of palpitations.  No further alerts on her watch.  She has been doing yoga and stretching and also kayaks during the summer.  No chest pain episodes.      Previous note:  Last time I saw her she was continuing to have chest pain which is concerning for unstable angina.  For this reason we proceeded with a cardiac catheterization which is detailed below.  She was found to have no obstructive coronary artery disease and we reviewed her films in clinic today.  She may have a component of microvascular dysfunction and her mother also has this.  She has been doing well since I last saw her.  No more episodes of chest pain.  Occasionally gets palpitations which she feels more so at nighttime when she is resting.  They do not last very long.  She continues to remain active and is getting out more now that the weather is nice.  Goes kayaking and hunting and hiking.    I reviewed her this recent lipid panel which showed that her cholesterol level was normal but her LDL was elevated at 127.  This gives her an ASCVD risk of 1%.    Results for orders placed during the hospital encounter of 12/07/22    Adult Transthoracic Echo Complete W/ Color, Spectral and Contrast if Necessary Per Protocol    Interpretation Summary    Left ventricular ejection fraction appears to be 56 - 60%.    Left ventricular diastolic function was normal.    Estimated right ventricular systolic pressure from tricuspid regurgitation is normal (<35 mmHg).      Cardiac catheterization  Coronary Angiogram.     1. Left main. Left main is a very short but large-caliber vessel which  gives rise to the Left Anterior Descending and the Left circumflex.  Left main is angiographically free from any significant disease     2. Left Anterior Descending Artery. LAD is a large vessel which gives rise to several septal perforators and several diagonal branches. It is angiographically free from any significant disease     3. Left Circumflex. The LCx is a medium caliber which gives rise to marginals.  Left circumflex artery is angiographically free from any significant disease     4. Right Coronary Artery. The RCA is a dominant vessel which gives rise to several small caliber branches including PDA and PLV.  Right coronary artery is angiographically free from any significant disease.     IMPRESSIONS.  1. Non-obstructive CAD  2.  Normal LVEDP     RECOMMENDATIONS.  1. Continue aggressive risk factor modification for primary prevention.  2. Exercise and lifestyle modifications recommended.       The following portions of the patient's history were reviewed and updated as appropriate: allergies, current medications, past family history, past medical history, past social history, past surgical history, and problem list.    Review of Systems   Constitutional: Negative for malaise/fatigue.   Cardiovascular:  Positive for leg swelling and palpitations. Negative for chest pain and dyspnea on exertion.   Respiratory:  Negative for cough and shortness of breath.    Gastrointestinal:  Negative for abdominal pain, nausea and vomiting.   Neurological:  Positive for dizziness and light-headedness. Negative for focal weakness, headaches and numbness.   All other systems reviewed and are negative.        Current Outpatient Medications:     metoprolol succinate XL (TOPROL-XL) 25 MG 24 hr tablet, Take 1 tablet by mouth once daily, Disp: 90 tablet, Rfl: 3    nitroglycerin (NITROSTAT) 0.4 MG SL tablet, Place 1 tablet under the tongue Every 5 (Five) Minutes As Needed. do not exceed a total of 3 doses in 15 minutes, Disp: , Rfl:      albuterol sulfate  (90 Base) MCG/ACT inhaler, Inhale 2 puffs 4 (Four) Times a Day. (Patient not taking: Reported on 6/14/2024), Disp: , Rfl:     No Known Allergies    Family History   Problem Relation Age of Onset    Heart failure Mother     Hypertension Mother     Heart disease Mother         Small vessel and congestive heart failure    Hyperlipidemia Father     Asthma Father     Heart disease Father     Hypertension Father     No Known Problems Sister     No Known Problems Brother     No Known Problems Maternal Aunt     No Known Problems Maternal Uncle     No Known Problems Paternal Aunt     No Known Problems Paternal Uncle     No Known Problems Maternal Grandmother     No Known Problems Maternal Grandfather     No Known Problems Paternal Grandmother     No Known Problems Paternal Grandfather     No Known Problems Other     Anemia Neg Hx     Arrhythmia Neg Hx     Clotting disorder Neg Hx     Fainting Neg Hx     Heart attack Neg Hx     Diabetes Neg Hx     Arthritis Neg Hx     Cancer Neg Hx        Past Surgical History:   Procedure Laterality Date    CARDIAC CATHETERIZATION N/A 05/19/2023    Procedure: Left Heart Cath with Coronary Angiography;  Surgeon: Jaciel Schneider MD;  Location: [facility] CATH INVASIVE LOCATION;  Service: Cardiovascular;  Laterality: N/A;    CARDIAC CATHETERIZATION  5-    DIAGNOSTIC LAPAROSCOPY  1989    DILATATION AND CURETTAGE  2011    TONSILLECTOMY  1990    TUBAL ABDOMINAL LIGATION  1999       Past Medical History:   Diagnosis Date    Abnormal ECG 11/17/2022    Colon polyps     Hepatitis     Hepatitis C infection     Myocardial infarction     Nuclear ekg here    Palpitations     Primary hypertension        Social History     Socioeconomic History    Marital status:    Tobacco Use    Smoking status: Former     Current packs/day: 0.00     Average packs/day: 1.5 packs/day for 18.3 years (27.4 ttl pk-yrs)     Types: Cigarettes     Start date: 5/1/1989     Quit date:  "2007     Years since quittin.8     Passive exposure: Past    Smokeless tobacco: Never   Vaping Use    Vaping status: Never Used   Substance and Sexual Activity    Alcohol use: Yes     Alcohol/week: 5.0 standard drinks of alcohol     Types: 5 Glasses of wine per week    Drug use: Not Currently     Types: Amphetamines     Comment: [id number]    Sexual activity: Yes     Partners: Male     Birth control/protection: Surgical           ECG 12 Lead    Date/Time: 2024 11:08 AM  Performed by: Eva Tolentino MD    Authorized by: Eva Tolentino MD  Comparison: compared with previous ECG   Similar to previous ECG  Rhythm: sinus rhythm  Rate: normal  BPM: 68  Conduction: conduction normal  QRS axis: normal    Clinical impression: normal ECG            Objective:       Physical Exam    /80   Pulse 70   Ht 153.7 cm (60.51\")   Wt 64.4 kg (142 lb)   SpO2 96%   BMI 27.27 kg/m²   The patient is alert, oriented and in no distress.    Vital signs as noted above.    Head and neck revealed no carotid bruits or jugular venous distension.  No thyromegaly or lymphadenopathy is present.    Lungs clear.  No wheezing.  Breath sounds are normal bilaterally.    Heart normal first and second heart sounds.  No murmur..  No pericardial rub is present.  No gallop is present.    Abdomen soft and nontender.  No organomegaly is present.    Extremities revealed good peripheral pulses without any pedal edema.    Skin warm and dry.    Musculoskeletal system is grossly normal.    CNS grossly normal.           Diagnosis Plan   1. Precordial pain        2. Palpitations        3. Primary hypertension        4. Preventative health care            LAB RESULTS (LAST 7 DAYS)    CBC        BMP        CMP         BNP        TROPONIN        CoAg        Creatinine Clearance  CrCl cannot be calculated (Patient's most recent lab result is older than the maximum 30 days allowed.).    ABG        Radiology  No radiology results for the last " day         Assessment and Plan       Diagnoses and all orders for this visit:    1. Precordial pain (Primary)    2. Palpitations    3. Primary hypertension    4. Preventative health care    Other orders  -     ECG 12 Lead           Chest pain  No further episodes  May have a component of microvascular dysfunction  Continue with metoprolol  Has nitroglycerin as needed  Cardiac catheterization did not show any obstructive CAD    Hypertension  Well-controlled  Continue with metoprolol    Palpitations  Continue with metoprolol  15-day Holter showed normal sinus rhythm  Now resolved    Preventative care  Lipid panel reviewed  ASCVD risk of 1%  Continue lifestyle and risk factor modification for primary prevention      Eva Tolentino MD

## 2024-12-02 RX ORDER — METOPROLOL SUCCINATE 25 MG/1
25 TABLET, EXTENDED RELEASE ORAL DAILY
Qty: 90 TABLET | Refills: 3 | Status: SHIPPED | OUTPATIENT
Start: 2024-12-02

## 2025-06-19 PROBLEM — K59.00 CONSTIPATION: Status: ACTIVE | Noted: 2023-10-10

## 2025-06-19 PROBLEM — M54.9 BACK PAIN WITH RADIATION: Status: ACTIVE | Noted: 2023-10-10

## 2025-06-19 PROBLEM — D17.9 LIPOMA: Status: ACTIVE | Noted: 2023-10-10

## 2025-06-19 PROBLEM — Z00.00 ENCOUNTER FOR SCREENING AND PREVENTATIVE CARE: Status: ACTIVE | Noted: 2025-06-19

## 2025-06-19 NOTE — PROGRESS NOTES
Subjective:     Encounter Date:06/20/2025        Patient ID: Lidia Khalil 1975     Chief Complaint:  1 year follow-up     History of Present Illness:  Lidia Khalil is a 50 y.o. female with a history of hepatitis C status post treatment, hypertension and palpitations who presents to the cardiology office for 1 year follow-up. The patient states about 6 months ago she started on a weight loss journey and has been increasing her exercising routine. She states she has lost 11 lbs so far. She states approximately 1 month ago while exercising, she had chest tightness, pain and soreness. She states it lasted for a few days. She denies any further episodes. She states she recently had labs done by her OB/GYN. She is complaining of hot flashes/night sweats and insomnia. She is debating on starting hormone replacement therapy versus natural supplements.       Current cardiac medications include:  metoprolol succinate       Review of systems:  Review of Systems   Constitutional: Negative for malaise/fatigue.   Cardiovascular:  Positive for palpitations. Negative for chest pain, dyspnea on exertion and leg swelling.   Respiratory:  Negative for cough and shortness of breath.    Gastrointestinal:  Negative for abdominal pain, nausea and vomiting.   Neurological:  Positive for light-headedness. Negative for dizziness, headaches, numbness and weakness.   All other systems reviewed and are negative.        The following portions of the patient's history were reviewed and updated as appropriate: allergies, current medications, past family history, past medical history, past social history, past surgical history and problem list.    Past Medical History:  Past Medical History:   Diagnosis Date    Abnormal ECG 11/17/2022    Colon polyps     Hepatitis     Hepatitis C infection     Myocardial infarction     Nuclear ekg here    Palpitations     Primary hypertension        Past Surgical History:  Past Surgical  History:   Procedure Laterality Date    CARDIAC CATHETERIZATION N/A 2023    Procedure: Left Heart Cath with Coronary Angiography;  Surgeon: Jaciel Schneider MD;  Location: [facility] CATH INVASIVE LOCATION;  Service: Cardiovascular;  Laterality: N/A;    CARDIAC CATHETERIZATION  2023    DIAGNOSTIC LAPAROSCOPY      DILATATION AND CURETTAGE      TONSILLECTOMY      TUBAL ABDOMINAL LIGATION          Allergies:  No Known Allergies    Current Meds:     Current Outpatient Medications:     metoprolol succinate XL (TOPROL-XL) 25 MG 24 hr tablet, Take 1 tablet by mouth Daily., Disp: 90 tablet, Rfl: 3    nitroglycerin (NITROSTAT) 0.4 MG SL tablet, Place 1 tablet under the tongue Every 5 (Five) Minutes As Needed. do not exceed a total of 3 doses in 15 minutes, Disp: , Rfl:     aspirin 81 MG EC tablet, Take 1 tablet by mouth Daily., Disp: 90 tablet, Rfl: 3    Social History:   Social History     Tobacco Use    Smoking status: Former     Current packs/day: 0.00     Average packs/day: 1.5 packs/day for 18.3 years (27.4 ttl pk-yrs)     Types: Cigarettes     Start date: 1989     Quit date: 2007     Years since quittin.8     Passive exposure: Past    Smokeless tobacco: Never   Substance Use Topics    Alcohol use: Yes     Alcohol/week: 5.0 standard drinks of alcohol     Types: 5 Glasses of wine per week        Family History:  Family History   Problem Relation Age of Onset    Heart failure Mother     Hypertension Mother     Heart disease Mother         Small vessel and congestive heart failure    Hyperlipidemia Father     Asthma Father     Heart disease Father     Hypertension Father     No Known Problems Sister     No Known Problems Brother     No Known Problems Maternal Aunt     No Known Problems Maternal Uncle     No Known Problems Paternal Aunt     No Known Problems Paternal Uncle     No Known Problems Maternal Grandmother     No Known Problems Maternal Grandfather     No Known Problems Paternal  "Grandmother     No Known Problems Paternal Grandfather     No Known Problems Other     Anemia Neg Hx     Arrhythmia Neg Hx     Clotting disorder Neg Hx     Fainting Neg Hx     Heart attack Neg Hx     Diabetes Neg Hx     Arthritis Neg Hx     Cancer Neg Hx                  Objective:       Physical Exam:  Vitals reviewed.   Constitutional:       Appearance: Healthy appearance. Well-developed and well-groomed.   Eyes:      Conjunctiva/sclera: Conjunctivae normal.      Pupils: Pupils are equal, round, and reactive to light.   HENT:      Head: Normocephalic and atraumatic.    Mouth/Throat:      Mouth: Mucous membranes are moist.      Pharynx: Oropharynx is clear.   Pulmonary:      Effort: Pulmonary effort is normal.      Breath sounds: Normal breath sounds.   Cardiovascular:      PMI at left midclavicular line. Normal rate. Regular rhythm.      Murmurs: There is no murmur.   Pulses:     Intact distal pulses.   Edema:     Peripheral edema absent.   Abdominal:      General: Bowel sounds are normal.      Palpations: Abdomen is soft.   Musculoskeletal: Normal range of motion.      Cervical back: Normal range of motion and neck supple. Skin:     General: Skin is warm and dry.   Neurological:      Mental Status: Alert and oriented to person, place, and time.   Psychiatric:         Mood and Affect: Mood normal.         Behavior: Behavior normal.         Vital signs:  /84 (BP Location: Left arm, Patient Position: Sitting, Cuff Size: Adult)   Pulse 61   Ht 152.4 cm (60\")   Wt 61.7 kg (136 lb)   SpO2 100%   BMI 26.56 kg/m²       Recent labs reviewed:    CBC        BMP        CMP       Creatinine Clearance  CrCl cannot be calculated (Patient's most recent lab result is older than the maximum 30 days allowed.).    BNP        TROPONIN        CoAg          Cardiology results reviewed:    EKG    ECG 12 Lead    Date/Time: 6/20/2025 10:12 AM  Performed by: Ruth Gutierrez APRN    Authorized by: Ruth Gutierrez APRN  Comparison: " Additional Complaints compared with previous ECG from 6/14/2024  Similar to previous ECG  Rhythm: sinus rhythm  Rate: normal    Clinical impression: normal ECG  Comments: HR 67 bpm,  ms, QRS 90 ms, QTc 439 ms            Stress test  Results for orders placed during the hospital encounter of 12/07/22    Stress Test With Myocardial Perfusion One Day    Interpretation Summary    Left ventricular ejection fraction is normal.    Low risk for ischemic heart disease.    Myocardial perfusion imaging indicates a small-to-medium-sized infarct located in the septal wall with no significant ischemia noted.    Impressions are consistent with a low risk study.    GI artifact is present.    Findings consistent with a normal ECG stress test.      Echocardiogram  Results for orders placed during the hospital encounter of 12/07/22    Adult Transthoracic Echo Complete W/ Color, Spectral and Contrast if Necessary Per Protocol    Interpretation Summary    Left ventricular ejection fraction appears to be 56 - 60%.    Left ventricular diastolic function was normal.    Estimated right ventricular systolic pressure from tricuspid regurgitation is normal (<35 mmHg).      Cardiac catheterization  Results for orders placed during the hospital encounter of 05/19/23    Cardiac Catheterization/Vascular Study    Conclusion  OPERATORS:  1. Jaciel Schneider M.D., Attending Cardiologist      PROCEDURE PERFORMED.  Ultrasound guided vascular access  Left heart catheterization  Coronary Angiogram 29218  Moderate Sedation 30 minutes    INDICATIONS FOR PROCEDURE.  48 years old woman with multiple cardiovascular risk factors presented with chest pain concerning for unstable angina.  Her nuclear stress test was also abnormal.  After discussing the risk and benefit of the procedure she was brought into the Cath Lab for coronary angiography.  All  PROCEDURE IN DETAIL.  Informed consent was obtained from the patient after explaining the risks, benefits, and alternative options of  the procedure. After obtaining informed consent, the patient was brought to the cath lab and was prepped in a sterile fashion. Lidocaine 1% was used for local anesthesia into the right radial access site. The right radial artery was accessed under direct ultrasound visualization  with an angiocath needle via modified Seldinger technique. A 6F slender sheath was inserted successfully. Afterwards, 6F JR4  was advanced over a wire into the ascending aorta and used to cross the AV and obtain LV pressures.  AV gradient obtained via pullback technique. JR4 and JL3.5 diagnostic catheters were used to engage the ostia of the RCA and LM respectively. Images of the right and left coronary systems were obtained. All the catheters were exchanged over a wire and subsequently removed. The patient tolerated the procedure well without any complications. The pictures were reviewed at the end of the procedure. TR band applied to right wrist for hemostasis and inflated with 15 cc of air. No complications were encountered.    HEMODYNAMICS.  LV: 106/4, 6 mmHg  AO: 111/78, 94 mmHg  No significant gradient across the aortic valve during pullback of JR4 catheter.  LV gram was not performed due to recent echocardiogram.    FINDINGS.    Coronary Angiogram.    1. Left main. Left main is a very short but large-caliber vessel which gives rise to the Left Anterior Descending and the Left circumflex.  Left main is angiographically free from any significant disease    2. Left Anterior Descending Artery. LAD is a large vessel which gives rise to several septal perforators and several diagonal branches. It is angiographically free from any significant disease    3. Left Circumflex. The LCx is a medium caliber which gives rise to marginals.  Left circumflex artery is angiographically free from any significant disease    4. Right Coronary Artery. The RCA is a dominant vessel which gives rise to several small caliber branches including PDA and PLV.   Right coronary artery is angiographically free from any significant disease.    IMPRESSIONS.  1. Non-obstructive CAD  2.  Normal LVEDP    RECOMMENDATIONS.  1. Continue aggressive risk factor modification for primary prevention.  2. Exercise and lifestyle modifications recommended.               Assessment:         Diagnoses and all orders for this visit:    1. Primary hypertension (Primary)    2. Palpitations    3. Encounter for screening and preventative care    4. Perimenopause    Other orders  -     aspirin 81 MG EC tablet; Take 1 tablet by mouth Daily.  Dispense: 90 tablet; Refill: 3  -     ECG 12 Lead                Plan:       Primary Hypertension, chronic  Blood pressure is well-controlled  Continue Toprol XL     History of Palpitations  Previous 15-day Holter showed normal sinus rhythm  She is now on a beta blocker   She denies any further episodes.   EKG today shows normal sinus rhythm.     Chest pain  The patient reports 1 episode approximately 1 month ago that occurred while exercising. She states it lasted for a few days.   Cardiac catheterization in May 2023 did not show any obstructive CAD  She may have a component of microvascular dysfunction.   Continue beta blocker  She has nitroglycerin to take as needed   She was advised to start taking aspirin 81 mg daily   She will notify us if she has any more episodes.      Mixed hyperlipidemia  Recent lipid panel done at outside facility on 4/28/2025 shows total cholesterol 211, triglycerides 123, HDL 74, and   Continued lifestyle modifications, including diet and exercise encouraged  Recommend repeat lipid panel in 6 months  Goal LDL less than 70 given microvascular dysfunction and family history of CAD  Consider starting statin if LDL not at goal    Encounter for screening and preventative care  BMI is 26.56. She weighs 136 lbs.  She states she has lost 11 lbs over the past 6 months with diet and exercise.  Continued lifestyle and risk factor  modification for primary prevention encouraged.   She was advised to start taking a baby aspirin.     Perimenopause  The patient is being followed by OB/GYN and recently had her hormone levels tested.  She is considering starting HRT versus OTC/natural supplements      Electronically signed by SIXTO Mcmahon, 06/20/25, 10:21 AM EDT.

## 2025-06-20 ENCOUNTER — OFFICE VISIT (OUTPATIENT)
Dept: CARDIOLOGY | Facility: CLINIC | Age: 50
End: 2025-06-20
Payer: COMMERCIAL

## 2025-06-20 VITALS
HEIGHT: 60 IN | BODY MASS INDEX: 26.7 KG/M2 | OXYGEN SATURATION: 100 % | WEIGHT: 136 LBS | DIASTOLIC BLOOD PRESSURE: 84 MMHG | HEART RATE: 61 BPM | SYSTOLIC BLOOD PRESSURE: 134 MMHG

## 2025-06-20 DIAGNOSIS — N95.1 PERIMENOPAUSE: ICD-10-CM

## 2025-06-20 DIAGNOSIS — I10 PRIMARY HYPERTENSION: Primary | Chronic | ICD-10-CM

## 2025-06-20 DIAGNOSIS — E78.2 MIXED HYPERLIPIDEMIA: ICD-10-CM

## 2025-06-20 DIAGNOSIS — Z00.00 ENCOUNTER FOR SCREENING AND PREVENTATIVE CARE: ICD-10-CM

## 2025-06-20 RX ORDER — ASPIRIN 81 MG/1
81 TABLET ORAL DAILY
Qty: 90 TABLET | Refills: 3 | Status: SHIPPED | OUTPATIENT
Start: 2025-06-20

## (undated) DEVICE — TR BAND RADIAL ARTERY COMPRESSION DEVICE: Brand: TR BAND

## (undated) DEVICE — DRAPE, RADIAL, STERILE: Brand: MEDLINE

## (undated) DEVICE — GLIDESHEATH SLENDER ACCESS KIT: Brand: GLIDESHEATH SLENDER

## (undated) DEVICE — PROVE COVER: Brand: UNBRANDED

## (undated) DEVICE — PK TRY HEART CATH 50

## (undated) DEVICE — CATH DIAG IMPULSE FL3.5 6F 100CM

## (undated) DEVICE — GW EMR FIX EXCHG J STD .035 3MM 260CM

## (undated) DEVICE — CATH DIAG IMPULSE FR4 6F 100CM

## (undated) DEVICE — ELECTRD DEFIB M/FUNC PROPADZ RADIOL 2PK